# Patient Record
Sex: MALE | Race: WHITE | Employment: FULL TIME | URBAN - METROPOLITAN AREA
[De-identification: names, ages, dates, MRNs, and addresses within clinical notes are randomized per-mention and may not be internally consistent; named-entity substitution may affect disease eponyms.]

---

## 2017-10-10 ENCOUNTER — OFFICE VISIT (OUTPATIENT)
Dept: CARDIOLOGY CLINIC | Age: 52
End: 2017-10-10

## 2017-10-10 VITALS
HEART RATE: 74 BPM | WEIGHT: 193 LBS | HEIGHT: 70 IN | DIASTOLIC BLOOD PRESSURE: 70 MMHG | BODY MASS INDEX: 27.63 KG/M2 | OXYGEN SATURATION: 99 % | SYSTOLIC BLOOD PRESSURE: 100 MMHG

## 2017-10-10 DIAGNOSIS — E78.00 HYPERCHOLESTEREMIA: Primary | ICD-10-CM

## 2017-10-10 DIAGNOSIS — Z82.49 FAMILY HISTORY OF EARLY CAD: ICD-10-CM

## 2017-10-10 NOTE — MR AVS SNAPSHOT
Visit Information Date & Time Provider Department Dept. Phone Encounter #  
 10/10/2017 10:40 AM Christopher Etienne MD [facility] [phone] [id number] Upcoming Health Maintenance Date Due DTaP/Tdap/Td series (1 - Tdap) 5/17/1987 FOBT Q 1 YEAR AGE 50-75 5/17/2016 INFLUENZA AGE 9 TO ADULT 8/1/2017 Allergies as of 10/10/2017  Review Complete On: 10/10/2017 By: Christopher Etienne MD  
 No Known Allergies Current Immunizations  Never Reviewed No immunizations on file. Not reviewed this visit You Were Diagnosed With   
  
 Codes Comments Family history of early CAD    -  Primary ICD-10-CM: Z82.49 
ICD-9-CM: V17.3 Vitals BP Pulse Height(growth percentile) Weight(growth percentile) SpO2 BMI  
 100/70 (BP 1 Location: Right arm, BP Patient Position: Sitting) 74 5' 10\" (1.778 m) 193 lb (87.5 kg) 99% 27.69 kg/m2 Smoking Status Never Smoker Vitals History BMI and BSA Data Body Mass Index Body Surface Area  
 27.69 kg/m 2 2.08 m 2 Preferred Pharmacy Pharmacy Name Phone Kristin Blizzard [address] [address] [facility] [phone] Your Updated Medication List  
  
   
This list is accurate as of: 10/10/17 11:19 AM.  Always use your most recent med list.  
  
  
  
  
 aspirin delayed-release 81 mg tablet Take  by mouth daily. FISH OIL PO Take  by mouth. LIPITOR 40 mg tablet Generic drug:  atorvastatin Take  by mouth daily. We Performed the Following AMB POC EKG ROUTINE W/ 12 LEADS, INTER & REP [15764 CPT(R)] Patient Instructions Follow up with Dr Kandi Hagen in 1 year. Introducing [facility] & HEALTH SERVICES! [facility] [employer] introduces [employer] patient portal. Now you can access parts of your medical record, email your doctor's office, and request medication refills online.    
 
1. In your internet browser, go to [email or web address] 2. Click on the First Time User? Click Here link in the Sign In box. You will see the New Member Sign Up page. 3. Enter your [employer] Access Code exactly as it appears below. You will not need to use this code after youve completed the sign-up process. If you do not sign up before the expiration date, you must request a new code. · [employer] Access Code: [email or web address] Expires: 1/8/2018 11:19 AM 
 
4. Enter the last four digits of your Social Security Number (xxxx) and Date of Birth (mm/dd/yyyy) as indicated and click Submit. You will be taken to the next sign-up page. 5. Create a Second Sightt ID. This will be your [employer] login ID and cannot be changed, so think of one that is secure and easy to remember. 6. Create a [employer] password. You can change your password at any time. 7. Enter your Password Reset Question and Answer. This can be used at a later time if you forget your password. 8. Enter your e-mail address. You will receive e-mail notification when new information is available in [address] 9. Click Sign Up. You can now view and download portions of your medical record. 10. Click the Download Summary menu link to download a portable copy of your medical information. If you have questions, please visit the Frequently Asked Questions section of the [employer] website. Remember, [employer] is NOT to be used for urgent needs. For medical emergencies, dial 911. Now available from your iPhone and Android! Please provide this summary of care documentation to your next provider. Your primary care clinician is listed as Sharyn Lilly. If you have any questions after today's visit, please call [phone].

## 2018-10-04 ENCOUNTER — OFFICE VISIT (OUTPATIENT)
Dept: CARDIOLOGY CLINIC | Age: 53
End: 2018-10-04

## 2018-10-04 VITALS
HEART RATE: 72 BPM | SYSTOLIC BLOOD PRESSURE: 100 MMHG | OXYGEN SATURATION: 98 % | HEIGHT: 70 IN | BODY MASS INDEX: 27.35 KG/M2 | RESPIRATION RATE: 16 BRPM | WEIGHT: 191 LBS | DIASTOLIC BLOOD PRESSURE: 60 MMHG

## 2018-10-04 DIAGNOSIS — Z82.49 FAMILY HISTORY OF EARLY CAD: Primary | ICD-10-CM

## 2018-10-04 DIAGNOSIS — E78.00 HYPERCHOLESTEREMIA: ICD-10-CM

## 2018-10-04 NOTE — PROGRESS NOTES
Tamika Franco     1965       Vivian Castellanos MD, [facility]  Date of Visit-10/4/2018   PCP is Marly Guido MD   [address] [place] [address] [facility] [place]  [facility]  HPI:  Tamika Franco is a 47 y.o. male   Follow up of cardiovascular risk factors including dyslipidemia and family hx. Overall the pt states he is doing well. Pt's recent lipid panel showed that TG was 54 and LDL was 107. Pt is on Aspirin and Lipitor, and he's tolerating his medications fine. Pt's last scan for aneurysm showed no abnormalities. Denies chest pain, edema, syncope or shortness of breath at rest, has no tachycardia, palpitations or sense of arrhythmia. EKG: Sinus rhythm within normal limits    Assessment/Plan:     1. CV risk factors. Modified with LDL elevated but on statin now. Follow up in one year. Impression:   1. Family history of early CAD    2. Hypercholesteremia       Cardiac History:   He has a history of dyslipidemia. He takes Lipitor and aspirin since . His surgical history includes tonsillectomy in . He had a pneumonia in 2009. Labs 14= Tc 134 LDL 44  HDL 50    Social history: Nonsmoker. Has 2-3 glasses of wine per week. Has two cups of coffee. He works as an  and .  with three children. Exercising, running, cycling, fishing. Family history: Mother  of ruptured AAA with hypertension, high cholesterol, hip arthritis. Father  with a stroke, atrial fibrillation, hypertension. One brother  of suicide at 29. ROS-except as noted above. . A complete cardiac and respiratory are reviewed and negative except as above ; Resp-denies wheezing  or productive cough,.  Const- No unusual weight loss or fever; Neuro-no recent seizure or CVA ; GI- No BRBPR, abdom pain, bloating ; - no  hematuria   see supplement sheet, initialed and to be scanned by staff  Past Medical History:   Diagnosis Date    Family history of early CAD 9/28/2015    High cholesterol     Hypercholesteremia 9/28/2015      Social Hx= reports that he has never smoked. He has never used smokeless tobacco. He reports that he drinks alcohol. Exam and Labs:  /60 (BP 1 Location: Left arm, BP Patient Position: Sitting)  Pulse 72  Resp 16  Ht 5' 10\" (1.778 m)  Wt 191 lb (86.6 kg)  SpO2 98%  BMI 27.41 kg/p2Dipgjpclbvxevu:  NAD, comfortable  Head: NC,AT. Eyes: No scleral icterus. Neck:  Neck supple. No JVD present. Throat: moist mucous membranes. Chest: Effort normal & normal respiratory excursion . Neurological: alert, conversant and oriented . Skin: Skin is not cold. No obvious systemic rash noted. Not diaphoretic. No erythema. Psychiatric:  Grossly normal mood and affect. Behavior appears normal. Extremities:  no clubbing or cyanosis. Abdomen: non distended; no pulsatile mass    Lungs:breath sounds normal. No stridor. distress, wheezes or  Rales. Heart: normal rate, regular rhythm, normal S1, S2, no murmurs, rubs, clicks or gallops , PMI non displaced. No HJR   Edema: Edema is none. Wt Readings from Last 3 Encounters:   10/04/18 191 lb (86.6 kg)   10/10/17 193 lb (87.5 kg)   09/01/16 190 lb 12.8 oz (86.5 kg)      BP Readings from Last 3 Encounters:   10/04/18 100/60   10/10/17 100/70   09/01/16 90/60      Current Outpatient Prescriptions   Medication Sig    atorvastatin (LIPITOR) 40 mg tablet Take  by mouth daily.  aspirin delayed-release 81 mg tablet Take  by mouth daily.  DOCOSAHEXANOIC ACID/EPA (FISH OIL PO) Take  by mouth. No current facility-administered medications for this visit. Impression see above.       Written by Jessica Carlisle, as dictated by Chesley Osler, MD.

## 2018-10-04 NOTE — MR AVS SNAPSHOT
[address] [facility] [address] [place] [address] 
[phone] Patient: Sunni Haynes MRN: [id number] :1965 Visit Information Date & Time Provider Department Dept. Phone Encounter #  
 10/4/2018  9:20 AM Elvira Pelletier MD [facility] [phone] [id number] Upcoming Health Maintenance Date Due DTaP/Tdap/Td series (1 - Tdap) 1987 Shingrix Vaccine Age 50> (1 of 2) 2016 FOBT Q 1 YEAR AGE 50-75 2016 Influenza Age 5 to Adult 2018 Allergies as of 10/4/2018  Review Complete On: 10/4/2018 By: Elvira Pelletier MD  
 No Known Allergies Current Immunizations  Never Reviewed No immunizations on file. Not reviewed this visit You Were Diagnosed With   
  
 Codes Comments Family history of early CAD    -  Primary ICD-10-CM: Z82.49 
ICD-9-CM: V17.3 Vitals BP Pulse Resp Height(growth percentile) Weight(growth percentile) SpO2  
 100/60 (BP 1 Location: Left arm, BP Patient Position: Sitting) 72 16 5' 10\" (1.778 m) 191 lb (86.6 kg) 98% BMI Smoking Status 27.41 kg/m2 Never Smoker Vitals History BMI and BSA Data Body Mass Index Body Surface Area  
 27.41 kg/m 2 2.07 m 2 Preferred Pharmacy Pharmacy Name Phone [facility] [address] [address] [address] [facility] [phone] Your Updated Medication List  
  
   
This list is accurate as of 10/4/18  9:56 AM.  Always use your most recent med list.  
  
  
  
  
 aspirin delayed-release 81 mg tablet Take  by mouth daily. FISH OIL PO Take  by mouth. LIPITOR 40 mg tablet Generic drug:  atorvastatin Take  by mouth daily. We Performed the Following AMB POC EKG ROUTINE W/ 12 LEADS, INTER & REP [95951 CPT(R)] Patient Instructions Follow up with Dr. Edita Godinez in 1 year. Introducing [facility] & HEALTH SERVICES! [facility] introduces [employer] patient portal. Now you can access parts of your medical record, email your doctor's office, and request medication refills online. 1. In your internet browser, go to [email or web address] 2. Click on the First Time User? Click Here link in the Sign In box. You will see the New Member Sign Up page. 3. Enter your Girlt[employer] Access Code exactly as it appears below. You will not need to use this code after youve completed the sign-up process. If you do not sign up before the expiration date, you must request a new code. · Girl[employer]ank Access Code: [email or web address] Expires: 1/2/2019  9:40 AM 
 
4. Enter the last four digits of your Social Security Number (xxxx) and Date of Birth (mm/dd/yyyy) as indicated and click Submit. You will be taken to the next sign-up page. 5. Create a Girltank ID. This will be your Girltank login ID and cannot be changed, so think of one that is secure and easy to remember. 6. Create a Girltank password. You can change your password at any time. 7. Enter your Password Reset Question and Answer. This can be used at a later time if you forget your password. 8. Enter your e-mail address. You will receive e-mail notification when new information is available in [address] 9. Click Sign Up. You can now view and download portions of your medical record. 10. Click the Download Summary menu link to download a portable copy of your medical information. If you have questions, please visit the Frequently Asked Questions section of the [employer] website. Remember, [employer] is NOT to be used for urgent needs. For medical emergencies, dial 911. Now available from your iPhone and Android! Please provide this summary of care documentation to your next provider. Your primary care clinician is listed as Say Townsend. If you have any questions after today's visit, please call [phone].

## 2019-10-18 ENCOUNTER — OFFICE VISIT (OUTPATIENT)
Dept: CARDIOLOGY CLINIC | Age: 54
End: 2019-10-18

## 2019-10-18 VITALS
HEIGHT: 70 IN | DIASTOLIC BLOOD PRESSURE: 70 MMHG | RESPIRATION RATE: 16 BRPM | HEART RATE: 80 BPM | OXYGEN SATURATION: 96 % | BODY MASS INDEX: 27.4 KG/M2 | SYSTOLIC BLOOD PRESSURE: 104 MMHG | WEIGHT: 191.4 LBS

## 2019-10-18 DIAGNOSIS — Z82.49 FAMILY HISTORY OF EARLY CAD: Primary | ICD-10-CM

## 2019-10-18 DIAGNOSIS — E78.00 HYPERCHOLESTEREMIA: ICD-10-CM

## 2019-10-18 NOTE — PATIENT INSTRUCTIONS
Please get your Coronary Calcium Score completed, we will call you with results. If you do not hear from us within a week after the test please call Elizabeth at [phone]. We will see you back in on year.

## 2019-10-18 NOTE — PROGRESS NOTES
Carmen Dao     1965       Vivian Negron MD, [facility]  Date of Visit-10/18/2019   PCP is Li Travis MD   [facility] [place]  [facility]  HPI:  Carmen Dao is a 49 y.o. male   One year follow up of cardiovascular risk factors including dyslipidemia and family hx. He is on Lipitor and ASA. He had a calcium score in 2012 of 3. Recent labs reveal total cholesterol 150, LDL 55, HDL 44, and . Overall the pt states he is doing well. He notes that he is still exercising regularly. Denies chest pain, edema, syncope or shortness of breath at rest, has no tachycardia, palpitations or sense of arrhythmia. EKG: SR at 55 minor left axis deviation rate is 65    Assessment/Plan:     1. Family history of early CAD  Have recommended a repeat calcium score. Last was 7 years ago with a value of 3. He has been very proactive given his family history. 2. Hypercholesteremia  Key CAD CHF Meds             atorvastatin (LIPITOR) 40 mg tablet (Taking) Take  by mouth daily. aspirin delayed-release 81 mg tablet (Taking) Take  by mouth daily. DOCOSAHEXANOIC ACID/EPA (FISH OIL PO) (Taking) Take  by mouth. Lipids with Li Travis MD seem to be at goal with an LDL of 55. Follow up in one year. Future Appointments   Date Time Provider Alec Cuenca   10/19/2020 10:20 AM MD CARLA March ARIEL SCHED         Impression:   1. Family history of early CAD    2. Hypercholesteremia       Cardiac History:   He has a history of dyslipidemia. He takes Lipitor and aspirin since 1996. His surgical history includes tonsillectomy in 1974. He had a pneumonia in March 2009. Labs 5-9-14= Tc 134 LDL 44  HDL 50    Social history: Nonsmoker. Has 2-3 glasses of wine per week. Has two cups of coffee. He works as an  and .  with three children. Exercising, running, cycling, fishing. Family history: Mother  of ruptured AAA with hypertension, high cholesterol, hip arthritis. Father  with a stroke, atrial fibrillation, hypertension. One brother  of suicide at 29. ROS-except as noted above. . A complete cardiac and respiratory are reviewed and negative except as above ; Resp-denies wheezing  or productive cough,. Const- No unusual weight loss or fever; Neuro-no recent seizure or CVA ; GI- No BRBPR, abdom pain, bloating ; - no  hematuria   see supplement sheet, initialed and to be scanned by staff  Past Medical History:   Diagnosis Date    Family history of early CAD 2015    High cholesterol     Hypercholesteremia 2015      Social Hx= reports that he has never smoked. He has never used smokeless tobacco. He reports that he drinks alcohol. Exam and Labs:  /70 (BP 1 Location: Left arm, BP Patient Position: Sitting)   Pulse 80   Resp 16   Ht 5' 10\" (1.778 m)   Wt 191 lb 6.4 oz (86.8 kg)   SpO2 96%   BMI 27.46 kg/m² Constitutional:  NAD, comfortable  Head: NC,AT. Eyes: No scleral icterus. Neck:  Neck supple. No JVD present. Throat: moist mucous membranes. Chest: Effort normal & normal respiratory excursion . Neurological: alert, conversant and oriented . Skin: Skin is not cold. No obvious systemic rash noted. Not diaphoretic. No erythema. Psychiatric:  Grossly normal mood and affect. Behavior appears normal. Extremities:  no clubbing or cyanosis. 2+ DP on the left leg. Abdomen: non distended    Lungs:breath sounds normal. No stridor. distress, wheezes or  Rales. Heart: normal rate, regular rhythm, normal S1, S2, no murmurs, rubs, clicks or gallops , PMI non displaced. Edema: Edema is none.   No results found for: CHOL, CHOLX, CHLST, CHOLV, HDL, HDLP, LDL, LDLC, DLDLP, TGLX, TRIGL, TRIGP, CHHD, CHHDX  No results found for: NA, K, CL, CO2, AGAP, GLU, BUN, CREA, BUCR, GFRAA, GFRNA, CA, GFRAA   Wt Readings from Last 3 Encounters:   10/18/19 191 lb 6.4 oz (86.8 kg)   10/04/18 191 lb (86.6 kg)   10/10/17 193 lb (87.5 kg)      BP Readings from Last 3 Encounters:   10/18/19 104/70   10/04/18 100/60   10/10/17 100/70      Current Outpatient Medications   Medication Sig    atorvastatin (LIPITOR) 40 mg tablet Take  by mouth daily.  aspirin delayed-release 81 mg tablet Take  by mouth daily.  DOCOSAHEXANOIC ACID/EPA (FISH OIL PO) Take  by mouth. No current facility-administered medications for this visit. Impression see above.       Written by Dougie Isaacs, as dictated by Rex Pelletier MD.

## 2019-10-18 NOTE — Clinical Note
10/18/19 Patient: Veronique Little YOB: 1965 Date of Visit: 10/18/2019 Mert Garcia MD 
[address] [address] VIA Facsimile: [phone] Dear Mert Garcia MD, Thank you for referring Mr. Ni An to [address]  for evaluation. My notes for this consultation are attached. If you have questions, please do not hesitate to call me. I look forward to following your patient along with you.  
 
 
Sincerely, 
 
Joann Marrero MD

## 2019-10-18 NOTE — PROGRESS NOTES
Visit Vitals  /70 (BP 1 Location: Left arm, BP Patient Position: Sitting)   Pulse 80   Resp 16   Ht 5' 10\" (1.778 m)   Wt 191 lb 6.4 oz (86.8 kg)   SpO2 96%   BMI 27.46 kg/m²

## 2019-12-19 ENCOUNTER — HOSPITAL ENCOUNTER (OUTPATIENT)
Dept: CT IMAGING | Age: 54
Discharge: HOME | End: 2019-12-19
Payer: SELF-PAY

## 2019-12-19 DIAGNOSIS — Z00.00 PREVENTATIVE HEALTH CARE: ICD-10-CM

## 2019-12-19 PROCEDURE — 75571 CT HRT W/O DYE W/CA TEST: CPT

## 2019-12-19 NOTE — PROGRESS NOTES
Great news  Fu CT score is 0  Keep up present plans and Rx  Future Appointments  10/19/2020 10:20 AM   MD Denise Valle

## 2019-12-24 NOTE — PROGRESS NOTES
Two patient identifiers verified. Per MD patient called and given results. Confirmed follow up. Patient verbalized understanding and denies any further questions or concerns at this time.

## 2021-04-29 ENCOUNTER — HOSPITAL ENCOUNTER (EMERGENCY)
Age: 56
Discharge: HOME | End: 2021-04-29
Attending: EMERGENCY MEDICINE
Payer: COMMERCIAL

## 2021-04-29 ENCOUNTER — APPOINTMENT (OUTPATIENT)
Dept: VASCULAR SURGERY | Age: 56
End: 2021-04-29
Attending: EMERGENCY MEDICINE
Payer: COMMERCIAL

## 2021-04-29 VITALS
TEMPERATURE: 98.1 F | SYSTOLIC BLOOD PRESSURE: 118 MMHG | WEIGHT: 192.9 LBS | OXYGEN SATURATION: 97 % | BODY MASS INDEX: 27.68 KG/M2 | RESPIRATION RATE: 18 BRPM | DIASTOLIC BLOOD PRESSURE: 82 MMHG | HEART RATE: 80 BPM

## 2021-04-29 DIAGNOSIS — M79.661 PAIN AND SWELLING OF RIGHT LOWER LEG: Primary | ICD-10-CM

## 2021-04-29 DIAGNOSIS — M79.89 PAIN AND SWELLING OF RIGHT LOWER LEG: Primary | ICD-10-CM

## 2021-04-29 PROCEDURE — 99281 EMR DPT VST MAYX REQ PHY/QHP: CPT

## 2021-04-29 PROCEDURE — 93971 EXTREMITY STUDY: CPT

## 2021-04-29 NOTE — ED TRIAGE NOTES
TRIAGE NOTE:  Patient arrives ambulatory with c/o right calf pain, patient reports tore muscle a little over a week ago.

## 2021-04-29 NOTE — ED PROVIDER NOTES
HPI patient is a 61-year-old white male with past medical history significant for hypercholesterolemia and family history of early heart disease who presents to the ED with right calf swelling and pain. He states he injured his leg about a week ago playing tennis and has some bruising from the medial aspect of the right calf down. He has a palpable step-off mid calf which is consistent with a plantaris tendon injury. Skin integrity is intact. There is no obvious bony or soft tissue deformity. Good neurovascular sensation. No apparent tendon or nerve injury. He has not had any medications today prior to arrival.  He was referred to the ED to rule out DVT. Past Medical History:   Diagnosis Date    Family history of early CAD 9/28/2015    High cholesterol     Hypercholesteremia 9/28/2015       Past Surgical History:   Procedure Laterality Date    HX TONSIL AND ADENOIDECTOMY           No family history on file.     Social History     Socioeconomic History    Marital status:      Spouse name: Not on file    Number of children: Not on file    Years of education: Not on file    Highest education level: Not on file   Occupational History    Not on file   Social Needs    Financial resource strain: Not on file    Food insecurity     Worry: Not on file     Inability: Not on file    Transportation needs     Medical: Not on file     Non-medical: Not on file   Tobacco Use    Smoking status: Never Smoker    Smokeless tobacco: Never Used   Substance and Sexual Activity    Alcohol use: Yes     Comment: occas    Drug use: Not on file    Sexual activity: Not on file   Lifestyle    Physical activity     Days per week: Not on file     Minutes per session: Not on file    Stress: Not on file   Relationships    Social connections     Talks on phone: Not on file     Gets together: Not on file     Attends Spiritism service: Not on file     Active member of club or organization: Not on file     Attends meetings of clubs or organizations: Not on file     Relationship status: Not on file    Intimate partner violence     Fear of current or ex partner: Not on file     Emotionally abused: Not on file     Physically abused: Not on file     Forced sexual activity: Not on file   Other Topics Concern    Not on file   Social History Narrative    Not on file         ALLERGIES: Patient has no known allergies. Review of Systems   Constitutional: Negative for activity change, appetite change, fever and unexpected weight change. HENT: Negative for congestion, sore throat and trouble swallowing. Eyes: Negative for visual disturbance. Respiratory: Negative for cough and shortness of breath. Cardiovascular: Positive for leg swelling. Negative for chest pain and palpitations. Gastrointestinal: Negative for abdominal pain, diarrhea, nausea and vomiting. Genitourinary: Negative for flank pain. Musculoskeletal: Negative for arthralgias, back pain and myalgias. Skin: Positive for color change. Neurological: Negative for headaches. All other systems reviewed and are negative. Vitals:    04/29/21 1432   BP: 118/82   Pulse: 80   Resp: 18   Temp: 98.1 °F (36.7 °C)   SpO2: 97%   Weight: 87.5 kg (192 lb 14.4 oz)            Physical Exam  Vitals signs and nursing note reviewed. Constitutional:       General: He is not in acute distress. Appearance: Normal appearance. He is normal weight. He is not ill-appearing, toxic-appearing or diaphoretic. Comments: White male, , non-smoker, athletic   HENT:      Head: Normocephalic. Neck:      Musculoskeletal: Normal range of motion and neck supple. Cardiovascular:      Rate and Rhythm: Normal rate and regular rhythm. Pulmonary:      Effort: Pulmonary effort is normal.      Breath sounds: Normal breath sounds. Musculoskeletal: Normal range of motion. General: Tenderness and signs of injury present.       Comments: Right medial calf is tender, swollen with ecchymosis from mid calf to ankle;Skin integrity is intact. There is no obvious bony or soft tissue deformity; no extending erythema. Good neurovascular sensation. No apparent tendon or nerve injury. Skin:     General: Skin is warm and dry. Findings: Bruising present. Neurological:      General: No focal deficit present. Mental Status: He is alert and oriented to person, place, and time. Psychiatric:         Mood and Affect: Mood normal.         Behavior: Behavior normal.          MDM       Procedures      Duplex venous study of the right leg was negative for DVT or thrombophlebitis. RICE recommendations were reviewed. Encourage close follow-up for further evaluation and treatment. 5:30 PM  Patient's results and plan of care have been reviewed with him. Patient has verbally conveyed his understanding and agreement of his signs, symptoms, diagnosis, treatment and prognosis and additionally agrees to follow up as recommended or return to the Emergency Room should his condition change prior to follow-up. Discharge instructions have also been provided to the patient with some educational information regarding his diagnosis as well a list of reasons why he would want to return to the ER prior to his follow-up appointment should his condition change. Mally Hadley NP    I was personally available for consultation in the emergency department. I have reviewed the chart and agree with the documentation recorded by the [facility], including the assessment, treatment plan, and disposition.   Alfredo Arguelles MD

## 2023-05-10 RX ORDER — ASPIRIN 81 MG/1
TABLET ORAL DAILY
COMMUNITY

## 2023-05-10 RX ORDER — ATORVASTATIN CALCIUM 40 MG/1
TABLET, FILM COATED ORAL DAILY
COMMUNITY

## 2023-05-18 NOTE — PROGRESS NOTES
Cooper Suarez     1965       Vivian Guerrero MD, [facility]  Date of Visit-10/10/2017   PCP is Celia Rizzo MD   [facility] [place]  [facility]  HPI:  Cooper Suarez is a 47 y.o. male   Denies chest pain, edema, syncope or shortness of breath at rest   Has no tachycardia , palpitations or sense of arrythmia   Some exercise  Good diet  Had labs in May that were fine he says with PCP  Had [facility] scanning,within normal limits   Assessment/Plan:     Stable dyslipidemia  Continue statin- LDL was 198 , dropped 45% on statin, great result  Fu one year  Went over [facility]   EKG today NSR minor left axis  Minimal plaque on carotids  Get copy of lipids from PCP , nurse will call their office  No future appointments. Impression:   1. Hypercholesteremia    2. Family history of early CAD       Cardiac History:   He has a history of dyslipidemia. He takes Lipitor and aspirin since . His surgical history includes tonsillectomy in . He had a pneumonia in 2009. Labs 14= Tc 134 LDL 44  HDL 50    Social history: Nonsmoker. Has 2-3 glasses of wine per week. Has two cups of coffee. He works as an  and .  with three children. Exercising, running, cycling, fishing. Family history: Mother  of ruptured AAA with hypertension, high cholesterol, hip arthritis. Father  with a stroke, atrial fibrillation, hypertension. One brother  of suicide at 29. ROS-except as noted above. . A complete cardiac and respiratory are reviewed and negative except as above ; Resp-denies wheezing  or productive cough,.  Const- No unusual weight loss or fever; Neuro-no recent seizure or CVA ; GI- No BRBPR, abdom pain, bloating ; - no  hematuria   see supplement sheet, initialed and to be scanned by staff  Past Medical History:   Diagnosis Date    Family history of early CAD 2015    High cholesterol     Hypercholesteremia 9/28/2015      Social Hx= reports that he has never smoked. He has never used smokeless tobacco. He reports that he drinks alcohol. Exam and Labs:  /70 (BP 1 Location: Right arm, BP Patient Position: Sitting)  Pulse 74  Ht 5' 10\" (1.778 m)  Wt 193 lb (87.5 kg)  SpO2 99%  BMI 27.69 kg/b0Ppfizltelbsgrs:  NAD, comfortable  Head: NC,AT. Eyes: No scleral icterus. Neck:  Neck supple. No JVD present. Throat: moist mucous membranes. Chest: Effort normal & normal respiratory excursion . Neurological: alert, conversant and oriented . Skin: Skin is not cold. No obvious systemic rash noted. Not diaphoretic. No erythema. Psychiatric:  Grossly normal mood and affect. Behavior appears normal. Extremities:  no clubbing or cyanosis. Abdomen: non distended    Lungs:breath sounds normal. No stridor. distress, wheezes or  Rales. Heart:    normal rate, regular rhythm, normal S1, S2, no murmurs, rubs, clicks or gallops , PMI non displaced. Edema: Edema is none. BP Readings from Last 3 Encounters:   10/10/17 100/70   09/01/16 90/60   09/01/15 110/74      Current Outpatient Prescriptions   Medication Sig    atorvastatin (LIPITOR) 40 mg tablet Take  by mouth daily.  aspirin delayed-release 81 mg tablet Take  by mouth daily.  DOCOSAHEXANOIC ACID/EPA (FISH OIL PO) Take  by mouth. No current facility-administered medications for this visit. Impression see above. Crescentic Advancement Flap Text: In order to maintain form and function of the airway, a crescentic advancement flap was planned.  After prep and local anesthesia, a Burow??????s triangle was excised superior to the defect.  A tangential and curvilinear incision was made onto the medial cheek.  Here, a crescentic Burow??????s triangle was excised.  The laterally-based flap was then raised by dissection in the deep subcutaneous plane and the remainder of the wound edges were also undermined.  After hemostasis, the flap was advanced into the defect with a periosteal suture at the base of the flap and the lateral nasal bone.  All wound edges were closed in a layered fashion.

## 2023-10-09 ENCOUNTER — OFFICE VISIT (OUTPATIENT)
Age: 58
End: 2023-10-09
Payer: COMMERCIAL

## 2023-10-09 VITALS
BODY MASS INDEX: 27.63 KG/M2 | SYSTOLIC BLOOD PRESSURE: 120 MMHG | HEIGHT: 70 IN | WEIGHT: 193 LBS | OXYGEN SATURATION: 96 % | DIASTOLIC BLOOD PRESSURE: 66 MMHG | HEART RATE: 74 BPM

## 2023-10-09 DIAGNOSIS — E78.00 HYPERCHOLESTEREMIA: Primary | ICD-10-CM

## 2023-10-09 DIAGNOSIS — Z71.89 CARDIAC RISK COUNSELING: ICD-10-CM

## 2023-10-09 DIAGNOSIS — Z82.49 FAMILY HISTORY OF ABDOMINAL AORTIC ANEURYSM (AAA): ICD-10-CM

## 2023-10-09 PROCEDURE — 99203 OFFICE O/P NEW LOW 30 MIN: CPT | Performed by: SPECIALIST

## 2023-10-09 ASSESSMENT — PATIENT HEALTH QUESTIONNAIRE - PHQ9
1. LITTLE INTEREST OR PLEASURE IN DOING THINGS: 0
SUM OF ALL RESPONSES TO PHQ QUESTIONS 1-9: 0
SUM OF ALL RESPONSES TO PHQ9 QUESTIONS 1 & 2: 0
2. FEELING DOWN, DEPRESSED OR HOPELESS: 0
SUM OF ALL RESPONSES TO PHQ QUESTIONS 1-9: 0

## 2023-10-09 NOTE — PROGRESS NOTES
sounds normal. No stridor. distress, wheezes or  Rales. Heart:    normal rate, regular rhythm, normal S1, S2, no murmurs, rubs, clicks or gallops , PMI non displaced. Edema: Edema is none. No results found for: \"CHOL\", \"CHOLX\", \"CHLST\", \"CHOLV\", \"HDL\", \"HDLC\", \"LDL\", \"LDLC\", \"TGLX\", \"TRIGL\"  No results found for: \"NA\", \"K\", \"CL\", \"CO2\", \"BUN\", \"CREATININE\", \"GLUCOSE\", \"CALCIUM\", \"LABGLOM\"    Wt Readings from Last 3 Encounters:   10/09/23 193 lb (87.5 kg)      BP Readings from Last 3 Encounters:   10/09/23 120/66        Current Outpatient Medications:     aspirin 81 MG EC tablet, Take by mouth daily, Disp: , Rfl:     atorvastatin (LIPITOR) 40 MG tablet, Take by mouth daily, Disp: , Rfl:    Impression see above.

## 2024-10-10 ENCOUNTER — OFFICE VISIT (OUTPATIENT)
Age: 59
End: 2024-10-10

## 2024-10-10 VITALS
OXYGEN SATURATION: 99 % | HEIGHT: 70 IN | DIASTOLIC BLOOD PRESSURE: 74 MMHG | HEART RATE: 61 BPM | SYSTOLIC BLOOD PRESSURE: 124 MMHG | WEIGHT: 193 LBS | BODY MASS INDEX: 27.63 KG/M2

## 2024-10-10 DIAGNOSIS — Z71.89 CARDIAC RISK COUNSELING: ICD-10-CM

## 2024-10-10 DIAGNOSIS — Z82.49 FAMILY HISTORY OF ABDOMINAL AORTIC ANEURYSM (AAA): ICD-10-CM

## 2024-10-10 DIAGNOSIS — E78.00 HYPERCHOLESTEREMIA: Primary | ICD-10-CM

## 2024-10-10 ASSESSMENT — PATIENT HEALTH QUESTIONNAIRE - PHQ9
SUM OF ALL RESPONSES TO PHQ QUESTIONS 1-9: 0
SUM OF ALL RESPONSES TO PHQ9 QUESTIONS 1 & 2: 0
SUM OF ALL RESPONSES TO PHQ QUESTIONS 1-9: 0
2. FEELING DOWN, DEPRESSED OR HOPELESS: NOT AT ALL
1. LITTLE INTEREST OR PLEASURE IN DOING THINGS: NOT AT ALL
SUM OF ALL RESPONSES TO PHQ QUESTIONS 1-9: 0
SUM OF ALL RESPONSES TO PHQ QUESTIONS 1-9: 0

## 2024-10-10 NOTE — PROGRESS NOTES
Tin Gerardo     1965       Rosi Mcknight MD, [facility]  Date of Visit-10/10/2024   PCP is Sandra Ulloa APRN - NP [facility] [place]  [facility]  HPI:  Tin Gerardo is a 59 y.o. male   1 year follow up     Today  Tin is overall doing well.  He has no complaints.    He is physically active without any significant symptoms or limitations.  He has some musculoskeletal issues that are very mild.  Denies chest pain, edema, syncope or shortness of breath at rest   Has no tachycardia , palpitations or sense of arrythmia           Lipid panel with PCP 5/31/2024 showed a cholesterol 177 triglycerides 85   AAA screening 11-23 showed no evidence of AAA  Coronary CT calcium score 12/19/2019 score of 0  CV risk   XOL, Family hx of CAD, fmaily hx of aortic aneurysm   Mother with AAA, gf with TAA  Has been on atorva and ASA   ER 4/29/21 with tennis injury  FLP with PCP 5/19/23  , , TG 62 HDL 46    EKG sinus rhythm within normal limits  Assessment/Plan:     Patient Instructions   We will see you back in two years.     The primary encounter diagnosis was Hypercholesteremia. Diagnoses of Family history of abdominal aortic aneurysm (AAA) and Cardiac risk counseling were also pertinent to this visit.   Tin returns today he has a family history of AAA and CAD.  1. Hypercholesteremia  Tin continues on Lipitor 40 mg daily.  2019 LDL 55  2023   2024   Calcium score 3 in 2012  Calcium score 0 in 2019  We discussed his risk and LDL extensively.  There has been some increase in LDL likely due to changes in activity and metabolism with aging.    While the LDL is greater than 100 his risk remains relatively low based on age and the low calcium score.  He does not have other risk factors including smoking or hypertension.  I do not favor a change in his statin at this time.  I think he should repeat the calcium score when it has been 5 years from the